# Patient Record
Sex: FEMALE | Race: BLACK OR AFRICAN AMERICAN | NOT HISPANIC OR LATINO | Employment: FULL TIME | URBAN - METROPOLITAN AREA
[De-identification: names, ages, dates, MRNs, and addresses within clinical notes are randomized per-mention and may not be internally consistent; named-entity substitution may affect disease eponyms.]

---

## 2024-04-23 DIAGNOSIS — O09.292 HISTORY OF PRE-ECLAMPSIA IN PRIOR PREGNANCY, CURRENTLY PREGNANT IN SECOND TRIMESTER: ICD-10-CM

## 2024-04-23 DIAGNOSIS — G40.909 SEIZURE DISORDER DURING PREGNANCY IN SECOND TRIMESTER: Primary | ICD-10-CM

## 2024-04-23 DIAGNOSIS — O99.352 SEIZURE DISORDER DURING PREGNANCY IN SECOND TRIMESTER: Primary | ICD-10-CM

## 2024-06-12 DIAGNOSIS — G40.909 SEIZURE DISORDER DURING PREGNANCY IN SECOND TRIMESTER: Primary | ICD-10-CM

## 2024-06-12 DIAGNOSIS — O09.292 HISTORY OF PRE-ECLAMPSIA IN PRIOR PREGNANCY, CURRENTLY PREGNANT IN SECOND TRIMESTER: ICD-10-CM

## 2024-06-12 DIAGNOSIS — O99.352 SEIZURE DISORDER DURING PREGNANCY IN SECOND TRIMESTER: Primary | ICD-10-CM

## 2024-06-17 ENCOUNTER — PROCEDURE VISIT (OUTPATIENT)
Dept: MATERNAL FETAL MEDICINE | Facility: CLINIC | Age: 29
End: 2024-06-17
Payer: MEDICAID

## 2024-06-17 ENCOUNTER — OFFICE VISIT (OUTPATIENT)
Dept: MATERNAL FETAL MEDICINE | Facility: CLINIC | Age: 29
End: 2024-06-17
Payer: MEDICAID

## 2024-06-17 VITALS
HEIGHT: 55 IN | DIASTOLIC BLOOD PRESSURE: 74 MMHG | HEART RATE: 101 BPM | SYSTOLIC BLOOD PRESSURE: 100 MMHG | BODY MASS INDEX: 30.55 KG/M2 | WEIGHT: 132 LBS

## 2024-06-17 DIAGNOSIS — O09.292 HISTORY OF PRE-ECLAMPSIA IN PRIOR PREGNANCY, CURRENTLY PREGNANT IN SECOND TRIMESTER: ICD-10-CM

## 2024-06-17 DIAGNOSIS — O99.352 SEIZURE DISORDER DURING PREGNANCY IN SECOND TRIMESTER: ICD-10-CM

## 2024-06-17 DIAGNOSIS — O09.90 AT HIGH RISK FOR COMPLICATIONS OF INTRAUTERINE PREGNANCY (IUP): ICD-10-CM

## 2024-06-17 DIAGNOSIS — G40.909 SEIZURE DISORDER DURING PREGNANCY IN SECOND TRIMESTER: ICD-10-CM

## 2024-06-17 DIAGNOSIS — O09.299 HX OF PRE-ECLAMPSIA IN PRIOR PREGNANCY, CURRENTLY PREGNANT: ICD-10-CM

## 2024-06-17 DIAGNOSIS — G40.909 NONINTRACTABLE EPILEPSY WITHOUT STATUS EPILEPTICUS, UNSPECIFIED EPILEPSY TYPE: Primary | Chronic | ICD-10-CM

## 2024-06-17 PROCEDURE — 1159F MED LIST DOCD IN RCRD: CPT | Mod: CPTII,S$GLB,, | Performed by: OBSTETRICS & GYNECOLOGY

## 2024-06-17 PROCEDURE — 1160F RVW MEDS BY RX/DR IN RCRD: CPT | Mod: CPTII,S$GLB,, | Performed by: OBSTETRICS & GYNECOLOGY

## 2024-06-17 PROCEDURE — 3074F SYST BP LT 130 MM HG: CPT | Mod: CPTII,S$GLB,, | Performed by: OBSTETRICS & GYNECOLOGY

## 2024-06-17 PROCEDURE — 3008F BODY MASS INDEX DOCD: CPT | Mod: CPTII,S$GLB,, | Performed by: OBSTETRICS & GYNECOLOGY

## 2024-06-17 PROCEDURE — 3078F DIAST BP <80 MM HG: CPT | Mod: CPTII,S$GLB,, | Performed by: OBSTETRICS & GYNECOLOGY

## 2024-06-17 PROCEDURE — 76815 OB US LIMITED FETUS(S): CPT | Mod: S$GLB,,, | Performed by: OBSTETRICS & GYNECOLOGY

## 2024-06-17 PROCEDURE — 99203 OFFICE O/P NEW LOW 30 MIN: CPT | Mod: TH,S$GLB,, | Performed by: OBSTETRICS & GYNECOLOGY

## 2024-06-17 RX ORDER — LEVETIRACETAM 500 MG/1
TABLET, EXTENDED RELEASE ORAL
COMMUNITY

## 2024-06-17 RX ORDER — PROMETHAZINE HYDROCHLORIDE 12.5 MG/1
12.5 TABLET ORAL 2 TIMES DAILY PRN
COMMUNITY
Start: 2024-04-05

## 2024-06-17 RX ORDER — ASPIRIN 81 MG/1
81 TABLET ORAL 2 TIMES DAILY
Qty: 60 TABLET | Refills: 11 | Status: SHIPPED | OUTPATIENT
Start: 2024-06-17 | End: 2025-06-17

## 2024-06-17 NOTE — PROGRESS NOTES
Maternal Fetal Medicine New Consult    Subjective:     Patient ID: [id number]    Chief Complaint: MFM Consult w/us  (For seizure disorder and postpartum pre-eclampsia)      HPI: 28 y.o.  female  at 16w0d gestation with Estimated Date of Delivery: 24. by early US, not consistent with LMP. She is sent for MFM consultation for epilepsy, history of postpartum preeclampsia.     Patient has history of epilepsy. She reports no seizure in years. She is currently on Keppra 500 mg twice daily and folic acid 1 mg daily. She follows with neurologist in [place] at [facility].  She has history of postpartum preeclampsia in previous pregnancy. She was on antihypertensives for about a month postpartum then was above to d/c with normal BP's.  Patient was accompanied by her partner and father of the baby Evelio Mendez       She denies any personal or family history of aneuploidy or anomalies. She denies any exposure to high fevers, viral rashes, illicit drugs or alcohol in this pregnancy.  She denies any leaking fluid, vaginal bleeding, contractions, decreased fetal movement. Denies headaches, visual disturbances, or epigastric pain.       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 No pertinent surgical history.    Family History   Problem Relation Name Age of Onset    Eclampsia Sister         Social History     Socioeconomic History    Marital status: Single   Tobacco Use    Smoking status: Never     Passive exposure: Never    Smokeless tobacco: Never   Substance and Sexual Activity    Alcohol use: Never    Drug use: Never    Sexual  "activity: Not Currently     Partners: Male       Current Outpatient Medications   Medication Sig Dispense Refill    folic acid (FOLVITE) 1 MG tablet Take 1 tablet (1 mg total) by mouth once daily. 90 tablet 3    levetiracetam XR (KEPPRA XR) 500 mg Tb24 24 hr tablet 1 tablet Orally twice a day      promethazine (PHENERGAN) 12.5 MG Tab Take 12.5 mg by mouth 2 (two) times daily as needed.      aspirin (ECOTRIN) 81 MG EC tablet Take 1 tablet (81 mg total) by mouth 2 (two) times a day. 60 tablet 11     No current facility-administered medications for this visit.       Review of patient's allergies indicates:  No Known Allergies     Review of Systems   12 point review of systems conducted, negative except as stated in the history of present illness. See HPI for details.      Objective:     Visit Vitals  /74 (BP Location: Left arm, Patient Position: Sitting, BP Method: Medium (Automatic))   Pulse 101   Ht 4' 6" (1.372 m)   Wt 59.9 kg (132 lb)   LMP  (LMP Unknown)   BMI 31.83 kg/m²        Physical Exam  Vitals and nursing note reviewed.   Constitutional:       General: She is not in acute distress.     Appearance: Normal appearance.      Comments: Increased BMI   HENT:      Head: Normocephalic and atraumatic.      Nose: Nose normal. No congestion.      Mouth/Throat:      Pharynx: Oropharynx is clear.   Eyes:      General: No scleral icterus.     Conjunctiva/sclera: Conjunctivae normal.   Cardiovascular:      Rate and Rhythm: Normal rate and regular rhythm.   Pulmonary:      Effort: No respiratory distress.      Breath sounds: Normal breath sounds. No wheezing.   Abdominal:      General: Abdomen is flat.      Palpations: Abdomen is soft.      Tenderness: There is no abdominal tenderness. There is no right CVA tenderness, left CVA tenderness or guarding.      Comments: No CVA tenderness gravid uterus.    Musculoskeletal:         General: Normal range of motion.      Cervical back: Neck supple.      Right lower leg: No " edema.      Left lower leg: No edema.   Skin:     General: Skin is warm.      Findings: No bruising or rash.   Neurological:      General: No focal deficit present.      Mental Status: She is oriented to person, place, and time.      Deep Tendon Reflexes: Reflexes normal.      Comments: Normal reflexes   Psychiatric:         Mood and Affect: Mood normal.         Behavior: Behavior normal.         Thought Content: Thought content normal.         Judgment: Judgment normal.         Assessment/Plan:     28 y.o.  female with IUP at 16w0d    Seizure disorder in pregnancy  Fetal size consistent with dates today, and no anomalies seen on early ultrasound.     I discussed with her the association of seizure disorder with higher risk of anomalies even without any use of medication. There is an increased risk of both major and minor malformations in fetuses exposed to anti-epileptic drugs (4-6% vs population risk of 2-3%). The most common major congenital malformations associated with these medications are neural tube, congenital heart and urinary tract defects, skeletal abnormalities, and cleft palate. The risk of fetal malformations is strongly influenced by the specific medication used.    Keppra is among the newer anti-epileptic drugs, and the risks are not yet certain, but small studies report minimal increased risks.     The risk is higher with the potential use of seizure medications.  Polytherapy with more than one antiepileptic drug increases the risk for malformations to 6-9%. In addition, the gestational timing of the exposure is critical as most organogenesis is complete by 10 weeks gestation. At this stage (past first trimester), there is no additional risk of anomalies. I discussed that the risk of seizure outweigh any risk of medication and recommend her to continue current medication regimen per neurology (keppra 500 mg BID), and advised to continue the Folic acid 1 mg daily. If she has any seizures, she  needs to report that immediately.  It is also recommended to avoid driving or operating heavy/hazardous equipment until 6 months post last seizure.    There are risks to the fetus from maternal seizures. Fetal hypoxia may occur as a result of decreased placental blood flow or postictal apnea and there are risks of injury to the fetus, abruption, or miscarriage due to maternal trauma sustained during a seizure.    Additionally, the higher risk of fetal growth restriction with seizure disorder was addressed.  I recommend fetal anatomy scan in 4 weeks.  I recommend doing fetal kick counts throughout the second and third trimester. If evidence of fetal growth restriction or other complications occur, then more formal fetal surveillance will be needed.    Elevated BMI greater than 30  Body mass index is 31.83 kg/m².    I discussed the risk of miscarriage in first trimester, recurrent miscarriages, congenital anomalies, hypertension, diabetes,  labor and the higher risk of  section and the higher risk of fetal demise in-utero. There is also higher risk of for excessive fetal weight and large for gestational age (LGA) fetuses. Mothers with LGA fetuses are at higher risk of prolonged labor,  delivery, shoulder dystocia and birth trauma. LGA neonates are increased risk of fetal hypoxia and intrauterine death, and are at risk to develop diabetes, obesity, metabolic syndrome, asthma and cancer later in life. She was advised of the importance of eating healthy and limiting weight gain to 11-20 lbs during the pregnancy, as optimal in this situation. I recommended low calorie, low fat diet avoiding any additional excessive weight gain. Excess weight gain would be associated with gestational hypertension, gestational diabetes and adverse  outcomes, including fetal demise in utero.    With elevated BMI, start fetal kick counts at 24 weeks. .       Importance of working on losing weight after the  pregnancy is over, especially before a future pregnancy was discussed. Breastfeeding may be an important tool in reducing the postpartum weight retention. Fetal risks were discussed with short term risk of fetal/ obesity and long term risk of adolescent component of metabolic syndrome.        History of postpartum preeclampsia in previous pregnancy  I discussed with her higher risk of recurrence even at an earlier gestational age with morbidity and mortality associated with that. I advised her of the option of the potential benefit of baby aspirin to decrease risk of recurrence that outweighs potential risk associated with its use. Low-dose aspirin as preventive medication does not increase the risk for placental abruption, postpartum hemorrhage, or fetal intracranial bleeding. Low-dose aspirin (range, 60 to 150 mg/d) reduced the risk for preeclampsia by 24% in clinical trials and reduced the risk for  birth by 14% and FGR by 20%.      With her risk factors, including  preeclampsia/GHTN in a previous pregnancy BMI over 30,  ancestry, and low socioeconomic status, it was agreed to adjust to asa 81 mg BID, due to multiple risk factors for preeclampsia. After discussing benefits (more effective than daily) vs potential risks (less data on safety with use at delivery), and continue until 34 6/7weeks, then decrease to once daily until delivery.. Also recommend baby aspirin use in all future pregnancies starting at 12 weeks and work on having healthy weight prior to any future pregnancy. Questions answered. Patient verbalized understanding.    Patient was counseled on the management plan for seizures ordered with the pregnancy.  Her seizures appeared to have been very well controlled over the last few years with no seizures for years.  Patient will continue same dose of Keppra 500 mg p.o. b.i.d. as well as folic acid 1 mg daily.  After counseling on aspirin daily versus b.i.d. agreed to do b.i.d.  till around 34 weeks' gestation.  We will plan to do a level 2 scan in 4 weeks. Patient's questions were answered.  She is in agreement with plan of care.         Follow up in about 4 weeks (around 7/15/2024) for MFM Followup, Level 2 Ultrasound.     No future appointments.       Patient was evaluated by Tasia Hodgson, DONNY, and and Dr. Charles.  Final assessment and recommendations as stated above were made by Dr. Charles.    This note was created with the assistance of [employer] voice recognition software. There may be transcription errors as a result of using this technology, however minimal. Effort has been made to ensure accuracy of transcription, but any obvious errors or omissions should be clarified with the author of the document.

## 2024-07-09 DIAGNOSIS — O99.352 SEIZURE DISORDER DURING PREGNANCY IN SECOND TRIMESTER: Primary | ICD-10-CM

## 2024-07-09 DIAGNOSIS — O09.292 HISTORY OF PRE-ECLAMPSIA IN PRIOR PREGNANCY, CURRENTLY PREGNANT IN SECOND TRIMESTER: ICD-10-CM

## 2024-07-09 DIAGNOSIS — G40.909 SEIZURE DISORDER DURING PREGNANCY IN SECOND TRIMESTER: Primary | ICD-10-CM

## 2024-07-16 NOTE — PROGRESS NOTES
"  Maternal Fetal Medicine Follow Up    Subjective:     Patient ID: [id number]    Chief Complaint: mfm followup w/us      HPI: Nicole Dougherty is a 28 y.o. female  at 20w2d gestation with Estimated Date of Delivery: 24  who is here for follow-up consultation by MFM.    Patient has history of epilepsy. She reports no seizure in years. She is currently on Keppra 500 mg twice daily and folic acid 1 mg daily. She follows with neurologist in [place] at [facility].  She had elevated BMI of 31.83 on initial MFM consult visit.  She has history of postpartum preeclampsia in previous pregnancy. She is on low-dose aspirin twice daily.       Interval history since last MFM visit: None.. She denies any leaking fluid, vaginal bleeding, contractions, decreased fetal movement. Denies headaches, visual disturbances, or epigastric pain.    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    Medications:  Current Outpatient Medications   Medication Instructions    aspirin (ECOTRIN) 81 mg, Oral, 2 times daily    folic acid (FOLVITE) 1 mg, Oral, Daily    levetiracetam XR (KEPPRA XR) 500 mg Tb24 24 hr tablet 1 tablet Orally twice a day    PNV no.153/FA/om3/dha/epa/fish (PRENATAL GUMMIES ORAL) Oral    promethazine (PHENERGAN) 12.5 mg, Oral, 2 times daily PRN       Review of Systems   12 point review of systems conducted, negative except as stated in the history of present illness. See HPI for details.      Objective:     Visit Vitals  BP 92/68 (BP Location: Left arm, Patient Position: Sitting, BP Method: Large (Automatic))   Pulse 92   Ht 4' 6" " (1.372 m)   Wt 60.1 kg (132 lb 6.4 oz)   LMP  (LMP Unknown)   BMI 31.92 kg/m²        Physical Exam  Vitals and nursing note reviewed.   Constitutional:       Appearance: Normal appearance.      Comments: Increased BMI   HENT:      Head: Normocephalic and atraumatic.      Nose: Nose normal. No congestion.   Cardiovascular:      Rate and Rhythm: Normal rate.   Pulmonary:      Effort: Pulmonary effort is normal.   Skin:     Findings: No rash.   Neurological:      Mental Status: She is alert and oriented to person, place, and time.   Psychiatric:         Mood and Affect: Mood normal.         Behavior: Behavior normal.         Thought Content: Thought content normal.         Judgment: Judgment normal.         Assessment/Plan:     28 y.o.  female with IUP at 20w2d    Seizure disorder in pregnancy  There is normal fetal growth with no anomalies seen today.  Fetal anatomy scan was incomplete.  on 24.  AFV is normal.      The risk of seizures outweigh any risk of medication and recommend her to continue current medication regimen per neurology (keppra 500 mg BID), and advised to continue the Folic acid 1 mg daily. If she has any seizures, she needs to report that immediately.  It is also recommended to avoid driving or operating heavy/hazardous equipment until 6 months post last seizure.    We will plan to see the patient again in about 5 weeks to complete anatomy scan.  With the higher risk of fetal growth restriction with seizure disorder, recommend follow-up ultrasound to assess fetal growth around 32 weeks. If evidence of fetal growth restriction or other complications occur, then more formal fetal surveillance will be needed.      Elevated BMI greater than 30  Body mass index is 31.92 kg/m². With stable weight since last visit, she was advised to continue healthy and low caloric diet.  Excess weight gain would be associated with gestational hypertension, gestational diabetes and adverse  outcomes,  including fetal demise in utero.    Reviewed importance of FKC 3/day and prn starting around 24 weeks with instructions to immediately report any decreased fetal movement.    It is important to lose weight after the pregnancy is over, especially before a future pregnancy. Breastfeeding may be an important tool in reducing the postpartum weight retention. Fetal risks were discussed with short term risk of fetal/ obesity and long term risk of adolescent component of metabolic syndrome.      History of postpartum preeclampsia in previous pregnancy  With her risk factors for preeclampsia, she will continue low dose aspirin twice daily until 34 6/7weeks, then decrease to once daily until delivery after that. Preeclampsia precautions reviewed.         Continue Keppra 500 b.i.d. along with folic acid 1 mg daily.  Continue aspirin b.i.d..  Diet advice.  We will plan to see in 5 weeks to complete anatomy scan.      Follow up in about 5 weeks (around 2024) for MFM follow-up, Repeat ultrasound.     No future appointments.       DANILO involvement: Patient was evaluated and examined by Dr. Charles. DONNY Chiu, helped in pre charting of part of note.    This note was created with the assistance of [employer] voice recognition software. There may be transcription errors as a result of using this technology, however minimal. Effort has been made to ensure accuracy of transcription, but any obvious errors or omissions should be clarified with the author of the document.

## 2024-07-17 ENCOUNTER — OFFICE VISIT (OUTPATIENT)
Dept: MATERNAL FETAL MEDICINE | Facility: CLINIC | Age: 29
End: 2024-07-17
Payer: MEDICAID

## 2024-07-17 ENCOUNTER — PROCEDURE VISIT (OUTPATIENT)
Dept: MATERNAL FETAL MEDICINE | Facility: CLINIC | Age: 29
End: 2024-07-17
Payer: MEDICAID

## 2024-07-17 VITALS
BODY MASS INDEX: 30.64 KG/M2 | WEIGHT: 132.38 LBS | HEIGHT: 55 IN | DIASTOLIC BLOOD PRESSURE: 68 MMHG | SYSTOLIC BLOOD PRESSURE: 92 MMHG | HEART RATE: 92 BPM

## 2024-07-17 DIAGNOSIS — G40.909 SEIZURE DISORDER DURING PREGNANCY IN SECOND TRIMESTER: ICD-10-CM

## 2024-07-17 DIAGNOSIS — O09.292 HISTORY OF PRE-ECLAMPSIA IN PRIOR PREGNANCY, CURRENTLY PREGNANT IN SECOND TRIMESTER: ICD-10-CM

## 2024-07-17 DIAGNOSIS — O09.90 AT HIGH RISK FOR COMPLICATIONS OF INTRAUTERINE PREGNANCY (IUP): ICD-10-CM

## 2024-07-17 DIAGNOSIS — G40.909 SEIZURE DISORDER DURING PREGNANCY IN SECOND TRIMESTER: Primary | ICD-10-CM

## 2024-07-17 DIAGNOSIS — O99.352 SEIZURE DISORDER DURING PREGNANCY IN SECOND TRIMESTER: Primary | ICD-10-CM

## 2024-07-17 DIAGNOSIS — O99.352 SEIZURE DISORDER DURING PREGNANCY IN SECOND TRIMESTER: ICD-10-CM

## 2024-07-17 DIAGNOSIS — O09.299 HX OF PRE-ECLAMPSIA IN PRIOR PREGNANCY, CURRENTLY PREGNANT: ICD-10-CM

## 2024-07-17 PROCEDURE — 76811 OB US DETAILED SNGL FETUS: CPT | Mod: S$GLB,,, | Performed by: OBSTETRICS & GYNECOLOGY

## 2024-07-17 PROCEDURE — 3078F DIAST BP <80 MM HG: CPT | Mod: CPTII,S$GLB,, | Performed by: OBSTETRICS & GYNECOLOGY

## 2024-07-17 PROCEDURE — 1159F MED LIST DOCD IN RCRD: CPT | Mod: CPTII,S$GLB,, | Performed by: OBSTETRICS & GYNECOLOGY

## 2024-07-17 PROCEDURE — 1160F RVW MEDS BY RX/DR IN RCRD: CPT | Mod: CPTII,S$GLB,, | Performed by: OBSTETRICS & GYNECOLOGY

## 2024-07-17 PROCEDURE — 3008F BODY MASS INDEX DOCD: CPT | Mod: CPTII,S$GLB,, | Performed by: OBSTETRICS & GYNECOLOGY

## 2024-07-17 PROCEDURE — 3074F SYST BP LT 130 MM HG: CPT | Mod: CPTII,S$GLB,, | Performed by: OBSTETRICS & GYNECOLOGY

## 2024-07-17 PROCEDURE — 99213 OFFICE O/P EST LOW 20 MIN: CPT | Mod: TH,S$GLB,, | Performed by: OBSTETRICS & GYNECOLOGY

## 2024-08-19 DIAGNOSIS — O99.352 SEIZURE DISORDER DURING PREGNANCY IN SECOND TRIMESTER: Primary | ICD-10-CM

## 2024-08-19 DIAGNOSIS — G40.909 SEIZURE DISORDER DURING PREGNANCY IN SECOND TRIMESTER: Primary | ICD-10-CM

## 2024-08-20 NOTE — PROGRESS NOTES
Maternal Fetal Medicine Follow Up    Subjective:     Patient ID: [id number]    Chief Complaint: M follow up with US      HPI: Nicole Dougherty is a 29 y.o. female  at 25w2d gestation with Estimated Date of Delivery: 24  who is here for follow-up consultation by MFM.    Patient has history of epilepsy. She reports no seizure in years. She is currently on Keppra 500 mg twice daily and folic acid 1 mg daily. She follows with neurologist in [place] at [facility].  She had elevated BMI of 31.83 on initial MFM consult visit.  She has history of postpartum preeclampsia in previous pregnancy. She is on low-dose aspirin twice daily.       Interval history since last MFM visit: None.. She denies any leaking fluid, vaginal bleeding, contractions, decreased fetal movement. Denies headaches, visual disturbances, or epigastric pain.    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    Medications:  Current Outpatient Medications   Medication Instructions    aspirin (ECOTRIN) 81 mg, Oral, 2 times daily    folic acid (FOLVITE) 1 mg, Oral, Daily    levetiracetam XR (KEPPRA XR) 500 mg Tb24 24 hr tablet 1 tablet Orally twice a day    PNV no.153/FA/om3/dha/epa/fish (PRENATAL GUMMIES ORAL) Oral    promethazine (PHENERGAN) 12.5 mg, Oral, 2 times daily PRN       Review of Systems   12 point review of systems conducted, negative except as stated in the history of present illness. See HPI for details.      Objective:     Visit Vitals  BP 95/65 (BP Location: Right arm, Patient Position: Sitting, BP Method: Medium (Automatic))   Pulse 90   Ht  "4' 6" (1.372 m)   Wt 61.2 kg (135 lb)   LMP  (LMP Unknown)   BMI 32.55 kg/m²        Physical Exam  Vitals and nursing note reviewed.   Constitutional:       Appearance: Normal appearance.      Comments: Increased BMI   HENT:      Head: Normocephalic and atraumatic.      Nose: Nose normal. No congestion.   Cardiovascular:      Rate and Rhythm: Normal rate.   Pulmonary:      Effort: Pulmonary effort is normal.   Skin:     Findings: No rash.   Neurological:      Mental Status: She is alert and oriented to person, place, and time.   Psychiatric:         Mood and Affect: Mood normal.         Behavior: Behavior normal.         Thought Content: Thought content normal.         Judgment: Judgment normal.         Assessment/Plan:     29 y.o.  female with IUP at 25w2d    Seizure disorder in pregnancy  There is normal fetal growth with an EFW of 772 g at the 41% and the AC at the 25% on 24.  AFV is normal.      The risk of seizures outweigh any risk of medication and recommend her to continue current medication regimen per neurology (keppra 500 mg BID), and advised to continue the Folic acid 1 mg daily. If she has any seizures, she needs to report that immediately.  It is also recommended to avoid driving or operating heavy/hazardous equipment until 6 months post last seizure.    With the higher risk of fetal growth restriction with seizure disorder, recommend follow-up ultrasound to assess fetal growth around 32 weeks. If evidence of fetal growth restriction or other complications occur, then more formal fetal surveillance will be needed.      Elevated BMI greater than 30  Body mass index is 32.55 kg/m². With stable weight since last visit, she was advised to continue healthy and low caloric diet.  Excess weight gain would be associated with gestational hypertension, gestational diabetes and adverse  outcomes, including fetal demise in utero.    Reviewed importance of FKC 3/day and prn starting around 24 " weeks with instructions to immediately report any decreased fetal movement.    It is important to lose weight after the pregnancy is over, especially before a future pregnancy. Breastfeeding may be an important tool in reducing the postpartum weight retention. Fetal risks were discussed with short term risk of fetal/ obesity and long term risk of adolescent component of metabolic syndrome.      History of postpartum preeclampsia in previous pregnancy  With her risk factors for preeclampsia, she will continue low dose aspirin twice daily until 34 6/7weeks, then decrease to once daily until delivery after that. Preeclampsia precautions reviewed.    There is continued normal fetal growth.  Fetal anatomy scan reassuring.  Continue aspirin b.i.d..  Fetal kick count instructions and preeclampsia warnings.  We will plan to rechecked in 6 weeks.    Follow up in about 6 weeks (around 10/2/2024) for MFM follow-up, Repeat ultrasound.     No future appointments.      DANILO involvement: Patient was evaluated and examined by Dr. Charles. CHAD Fishman, helped in pre charting of part of note.    This note was created with the assistance of [employer] voice recognition software. There may be transcription errors as a result of using this technology, however minimal. Effort has been made to ensure accuracy of transcription, but any obvious errors or omissions should be clarified with the author of the document.

## 2024-08-21 ENCOUNTER — OFFICE VISIT (OUTPATIENT)
Dept: MATERNAL FETAL MEDICINE | Facility: CLINIC | Age: 29
End: 2024-08-21
Payer: MEDICAID

## 2024-08-21 ENCOUNTER — PROCEDURE VISIT (OUTPATIENT)
Dept: MATERNAL FETAL MEDICINE | Facility: CLINIC | Age: 29
End: 2024-08-21
Payer: MEDICAID

## 2024-08-21 VITALS
HEIGHT: 55 IN | DIASTOLIC BLOOD PRESSURE: 65 MMHG | WEIGHT: 135 LBS | SYSTOLIC BLOOD PRESSURE: 95 MMHG | HEART RATE: 90 BPM | BODY MASS INDEX: 31.24 KG/M2

## 2024-08-21 DIAGNOSIS — O09.299 HX OF PRE-ECLAMPSIA IN PRIOR PREGNANCY, CURRENTLY PREGNANT: ICD-10-CM

## 2024-08-21 DIAGNOSIS — O99.352 SEIZURE DISORDER DURING PREGNANCY IN SECOND TRIMESTER: ICD-10-CM

## 2024-08-21 DIAGNOSIS — G40.909 SEIZURE DISORDER DURING PREGNANCY IN SECOND TRIMESTER: Primary | ICD-10-CM

## 2024-08-21 DIAGNOSIS — G40.909 SEIZURE DISORDER DURING PREGNANCY IN SECOND TRIMESTER: ICD-10-CM

## 2024-08-21 DIAGNOSIS — O99.352 SEIZURE DISORDER DURING PREGNANCY IN SECOND TRIMESTER: Primary | ICD-10-CM

## 2024-08-21 PROCEDURE — 3008F BODY MASS INDEX DOCD: CPT | Mod: CPTII,S$GLB,, | Performed by: OBSTETRICS & GYNECOLOGY

## 2024-08-21 PROCEDURE — 1159F MED LIST DOCD IN RCRD: CPT | Mod: CPTII,S$GLB,, | Performed by: OBSTETRICS & GYNECOLOGY

## 2024-08-21 PROCEDURE — 3074F SYST BP LT 130 MM HG: CPT | Mod: CPTII,S$GLB,, | Performed by: OBSTETRICS & GYNECOLOGY

## 2024-08-21 PROCEDURE — 3078F DIAST BP <80 MM HG: CPT | Mod: CPTII,S$GLB,, | Performed by: OBSTETRICS & GYNECOLOGY

## 2024-08-21 PROCEDURE — 1160F RVW MEDS BY RX/DR IN RCRD: CPT | Mod: CPTII,S$GLB,, | Performed by: OBSTETRICS & GYNECOLOGY

## 2024-08-21 PROCEDURE — 99213 OFFICE O/P EST LOW 20 MIN: CPT | Mod: TH,S$GLB,, | Performed by: OBSTETRICS & GYNECOLOGY

## 2024-08-21 PROCEDURE — 76816 OB US FOLLOW-UP PER FETUS: CPT | Mod: S$GLB,,, | Performed by: OBSTETRICS & GYNECOLOGY

## 2024-09-27 DIAGNOSIS — G40.909 SEIZURE DISORDER DURING PREGNANCY IN SECOND TRIMESTER: Primary | ICD-10-CM

## 2024-09-27 DIAGNOSIS — O99.352 SEIZURE DISORDER DURING PREGNANCY IN SECOND TRIMESTER: Primary | ICD-10-CM

## 2024-10-01 PROBLEM — O99.353 SEIZURE DISORDER DURING PREGNANCY IN THIRD TRIMESTER: Status: ACTIVE | Noted: 2024-06-17

## 2024-10-01 NOTE — PROGRESS NOTES
Maternal Fetal Medicine Follow Up    Subjective:     Patient ID: [id number]    Chief Complaint: mfm followup w/us      HPI: Nicole Dougherty is a 29 y.o. female  at 31w2d gestation with Estimated Date of Delivery: 24  who is here for follow-up consultation by MFM.    Patient has history of epilepsy. She reports no seizure in years. She is currently on Keppra 500 mg twice daily and folic acid 1 mg daily. She follows with neurologist in [place] at [facility].  She had elevated BMI of 31.83 on initial MFM consult visit.  She has history of postpartum preeclampsia in previous pregnancy. She is on low-dose aspirin twice daily.       Interval history since last MFM visit: None.. She denies any leaking fluid, vaginal bleeding, contractions, decreased fetal movement. Denies headaches, visual disturbances, or epigastric pain.    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    Medications:  Current Outpatient Medications   Medication Instructions    aspirin (ECOTRIN) 81 mg, Oral, 2 times daily    folic acid (FOLVITE) 1 mg, Oral, Daily    ICAR-C PLUS Tab 1 tablet Orally once a day for 30 days    levetiracetam XR (KEPPRA XR) 500 mg Tb24 24 hr tablet 1 tablet Orally twice a day    PNV no.153/FA/om3/dha/epa/fish (PRENATAL GUMMIES ORAL) Take by mouth.    promethazine (PHENERGAN) 12.5 mg, 2 times daily PRN       Review of Systems   12 point review of systems conducted, negative except as stated in the history of present illness. See HPI for details.      Objective:     Visit Vitals  BP (!) 100/59 (BP Location: Left arm, Patient  "Position: Sitting)   Pulse 90   Ht 4' 6" (1.372 m)   Wt 62.5 kg (137 lb 12.8 oz)   LMP  (LMP Unknown)   BMI 33.22 kg/m²        Physical Exam  Vitals and nursing note reviewed.   Constitutional:       Appearance: Normal appearance.      Comments: Increased BMI   HENT:      Head: Normocephalic and atraumatic.      Nose: Nose normal. No congestion.   Cardiovascular:      Rate and Rhythm: Normal rate.   Pulmonary:      Effort: Pulmonary effort is normal.   Skin:     Findings: No rash.   Neurological:      Mental Status: She is alert and oriented to person, place, and time.   Psychiatric:         Mood and Affect: Mood normal.         Behavior: Behavior normal.         Thought Content: Thought content normal.         Judgment: Judgment normal.         Assessment/Plan:     29 y.o.  female with IUP at 31w2d    Seizure disorder in pregnancy  There is normal fetal growth with an EFW of 1749 g at the 31% and the AC at the 42% on 10/2/24.  AFV is normal. BPP is 8/8 today (10/02/2024).     The risk of seizures outweigh any risk of medication and recommend her to continue current medication regimen per neurology (keppra 500 mg BID), and advised to continue the Folic acid 1 mg daily. If she has any seizures, she needs to report that immediately.  It is also recommended to avoid driving or operating heavy/hazardous equipment until 6 months post last seizure.    With the higher risk of fetal growth restriction with seizure disorder, recommend follow-up ultrasound to assess fetal growth in 4 weeks. If evidence of fetal growth restriction or other complications occur, then more formal fetal surveillance will be needed.      Elevated BMI greater than 30  Body mass index is 33.22 kg/m². With stable weight since last visit, she was advised to continue healthy and low caloric diet.  Excess weight gain would be associated with gestational hypertension, gestational diabetes and adverse  outcomes, including fetal demise in " utero.    Reviewed fetal kick count instructions..    It is important to lose weight after the pregnancy is over, especially before a future pregnancy. Breastfeeding may be an important tool in reducing the postpartum weight retention. Fetal risks were discussed with short term risk of fetal/ obesity and long term risk of adolescent component of metabolic syndrome.      History of postpartum preeclampsia in previous pregnancy  With her risk factors for preeclampsia, she will continue low dose aspirin twice daily until 34 6/7weeks, then decrease to once daily until delivery after that. Preeclampsia precautions reviewed.    There is continued normal fetal growth.  We will plan to rechecked again in about 4 weeks.  Patient will continue aspirin b.i.d..  Continue Keppra 500 b.i.d. with folic acid 1 mg daily.  There has been no recent seizures.    Follow up in about 4 weeks (around 10/30/2024) for MFM follow-up, Repeat ultrasound, BPP.     No future appointments.    DANILO involvement: Patient was evaluated and examined by Dr. Charles. CHAD Fishman, helped in pre charting of part of note.    This note was created with the assistance of [employer] voice recognition software. There may be transcription errors as a result of using this technology, however minimal. Effort has been made to ensure accuracy of transcription, but any obvious errors or omissions should be clarified with the author of the document.

## 2024-10-02 ENCOUNTER — PROCEDURE VISIT (OUTPATIENT)
Dept: MATERNAL FETAL MEDICINE | Facility: CLINIC | Age: 29
End: 2024-10-02
Payer: MEDICAID

## 2024-10-02 ENCOUNTER — OFFICE VISIT (OUTPATIENT)
Dept: MATERNAL FETAL MEDICINE | Facility: CLINIC | Age: 29
End: 2024-10-02
Payer: MEDICAID

## 2024-10-02 VITALS
SYSTOLIC BLOOD PRESSURE: 100 MMHG | BODY MASS INDEX: 31.89 KG/M2 | HEIGHT: 55 IN | HEART RATE: 90 BPM | DIASTOLIC BLOOD PRESSURE: 59 MMHG | WEIGHT: 137.81 LBS

## 2024-10-02 DIAGNOSIS — O99.352 SEIZURE DISORDER DURING PREGNANCY IN SECOND TRIMESTER: ICD-10-CM

## 2024-10-02 DIAGNOSIS — O09.299 HX OF PRE-ECLAMPSIA IN PRIOR PREGNANCY, CURRENTLY PREGNANT: ICD-10-CM

## 2024-10-02 DIAGNOSIS — G40.909 SEIZURE DISORDER DURING PREGNANCY IN THIRD TRIMESTER: Primary | ICD-10-CM

## 2024-10-02 DIAGNOSIS — O99.353 SEIZURE DISORDER DURING PREGNANCY IN THIRD TRIMESTER: Primary | ICD-10-CM

## 2024-10-02 DIAGNOSIS — G40.909 SEIZURE DISORDER DURING PREGNANCY IN SECOND TRIMESTER: ICD-10-CM

## 2024-10-02 PROCEDURE — 76816 OB US FOLLOW-UP PER FETUS: CPT | Mod: S$GLB,,, | Performed by: OBSTETRICS & GYNECOLOGY

## 2024-10-02 PROCEDURE — 3078F DIAST BP <80 MM HG: CPT | Mod: CPTII,S$GLB,, | Performed by: OBSTETRICS & GYNECOLOGY

## 2024-10-02 PROCEDURE — 1160F RVW MEDS BY RX/DR IN RCRD: CPT | Mod: CPTII,S$GLB,, | Performed by: OBSTETRICS & GYNECOLOGY

## 2024-10-02 PROCEDURE — 1159F MED LIST DOCD IN RCRD: CPT | Mod: CPTII,S$GLB,, | Performed by: OBSTETRICS & GYNECOLOGY

## 2024-10-02 PROCEDURE — 99213 OFFICE O/P EST LOW 20 MIN: CPT | Mod: TH,S$GLB,, | Performed by: OBSTETRICS & GYNECOLOGY

## 2024-10-02 PROCEDURE — 3008F BODY MASS INDEX DOCD: CPT | Mod: CPTII,S$GLB,, | Performed by: OBSTETRICS & GYNECOLOGY

## 2024-10-02 PROCEDURE — 3074F SYST BP LT 130 MM HG: CPT | Mod: CPTII,S$GLB,, | Performed by: OBSTETRICS & GYNECOLOGY

## 2024-10-02 RX ORDER — IRON,CARB/VIT C/VIT B12/FOLIC 100-250-1
TABLET ORAL
COMMUNITY
Start: 2024-09-19

## 2024-10-25 DIAGNOSIS — O99.353 SEIZURE DISORDER DURING PREGNANCY IN THIRD TRIMESTER: Primary | ICD-10-CM

## 2024-10-25 DIAGNOSIS — G40.909 SEIZURE DISORDER DURING PREGNANCY IN THIRD TRIMESTER: Primary | ICD-10-CM

## 2024-10-25 DIAGNOSIS — O09.299 HX OF PRE-ECLAMPSIA IN PRIOR PREGNANCY, CURRENTLY PREGNANT: ICD-10-CM

## 2024-10-30 ENCOUNTER — OFFICE VISIT (OUTPATIENT)
Dept: MATERNAL FETAL MEDICINE | Facility: CLINIC | Age: 29
End: 2024-10-30
Payer: MEDICAID

## 2024-10-30 ENCOUNTER — PROCEDURE VISIT (OUTPATIENT)
Dept: MATERNAL FETAL MEDICINE | Facility: CLINIC | Age: 29
End: 2024-10-30
Payer: MEDICAID

## 2024-10-30 VITALS
SYSTOLIC BLOOD PRESSURE: 105 MMHG | HEIGHT: 55 IN | BODY MASS INDEX: 32.63 KG/M2 | DIASTOLIC BLOOD PRESSURE: 73 MMHG | WEIGHT: 141 LBS | HEART RATE: 80 BPM

## 2024-10-30 DIAGNOSIS — O09.299 HX OF PRE-ECLAMPSIA IN PRIOR PREGNANCY, CURRENTLY PREGNANT: ICD-10-CM

## 2024-10-30 DIAGNOSIS — O99.353 SEIZURE DISORDER DURING PREGNANCY IN THIRD TRIMESTER: ICD-10-CM

## 2024-10-30 DIAGNOSIS — G40.909 SEIZURE DISORDER DURING PREGNANCY IN THIRD TRIMESTER: ICD-10-CM

## 2024-10-30 DIAGNOSIS — O09.90 AT HIGH RISK FOR COMPLICATIONS OF INTRAUTERINE PREGNANCY (IUP): ICD-10-CM

## 2024-10-30 DIAGNOSIS — G40.909 SEIZURE DISORDER DURING PREGNANCY IN THIRD TRIMESTER: Primary | ICD-10-CM

## 2024-10-30 DIAGNOSIS — O99.353 SEIZURE DISORDER DURING PREGNANCY IN THIRD TRIMESTER: Primary | ICD-10-CM

## 2024-10-30 DIAGNOSIS — O36.5930 POOR FETAL GROWTH AFFECTING MANAGEMENT OF MOTHER IN THIRD TRIMESTER, SINGLE OR UNSPECIFIED FETUS: ICD-10-CM

## 2024-10-30 PROCEDURE — 1160F RVW MEDS BY RX/DR IN RCRD: CPT | Mod: CPTII,S$GLB,, | Performed by: OBSTETRICS & GYNECOLOGY

## 2024-10-30 PROCEDURE — 99214 OFFICE O/P EST MOD 30 MIN: CPT | Mod: TH,S$GLB,, | Performed by: OBSTETRICS & GYNECOLOGY

## 2024-10-30 PROCEDURE — 76819 FETAL BIOPHYS PROFIL W/O NST: CPT | Mod: S$GLB,,, | Performed by: OBSTETRICS & GYNECOLOGY

## 2024-10-30 PROCEDURE — 76816 OB US FOLLOW-UP PER FETUS: CPT | Mod: S$GLB,,, | Performed by: OBSTETRICS & GYNECOLOGY

## 2024-10-30 PROCEDURE — 3008F BODY MASS INDEX DOCD: CPT | Mod: CPTII,S$GLB,, | Performed by: OBSTETRICS & GYNECOLOGY

## 2024-10-30 PROCEDURE — 3078F DIAST BP <80 MM HG: CPT | Mod: CPTII,S$GLB,, | Performed by: OBSTETRICS & GYNECOLOGY

## 2024-10-30 PROCEDURE — 76820 UMBILICAL ARTERY ECHO: CPT | Mod: S$GLB,,, | Performed by: OBSTETRICS & GYNECOLOGY

## 2024-10-30 PROCEDURE — 1159F MED LIST DOCD IN RCRD: CPT | Mod: CPTII,S$GLB,, | Performed by: OBSTETRICS & GYNECOLOGY

## 2024-10-30 PROCEDURE — 3074F SYST BP LT 130 MM HG: CPT | Mod: CPTII,S$GLB,, | Performed by: OBSTETRICS & GYNECOLOGY

## 2024-10-31 DIAGNOSIS — O36.5930 POOR FETAL GROWTH AFFECTING MANAGEMENT OF MOTHER IN THIRD TRIMESTER, SINGLE OR UNSPECIFIED FETUS: ICD-10-CM

## 2024-10-31 DIAGNOSIS — O09.299 HX OF PRE-ECLAMPSIA IN PRIOR PREGNANCY, CURRENTLY PREGNANT: ICD-10-CM

## 2024-10-31 DIAGNOSIS — G40.909 SEIZURE DISORDER DURING PREGNANCY IN THIRD TRIMESTER: Primary | ICD-10-CM

## 2024-10-31 DIAGNOSIS — O99.353 SEIZURE DISORDER DURING PREGNANCY IN THIRD TRIMESTER: Primary | ICD-10-CM

## 2024-11-01 NOTE — PROGRESS NOTES
Maternal Fetal Medicine Follow Up    Subjective:     Patient ID: [id number]    Chief Complaint: MFM follow up with [place]      HPI: Nicole Dougherty is a 29 y.o. female  at 36w0d gestation with Estimated Date of Delivery: 24  who is here for follow-up consultation by MFM.    Patient has history of epilepsy. She reports no seizure in years. She is currently on Keppra 500 mg twice daily and folic acid 1 mg daily. She follows with neurologist in [place] at [facility].  She had elevated BMI of 31.83 on initial MFM consult visit.  She has history of postpartum preeclampsia in previous pregnancy. She is on low-dose aspirin once daily.  She has mild fetal growth restriction and is here for fetal testing today.  She reported normal aneuploidy screening earlier in pregnancy (requested).  She declined amniocentesis.       Interval history since last MFM visit: None.. She denies any leaking fluid, vaginal bleeding, contractions, decreased fetal movement. Denies headaches, visual disturbances, or epigastric pain.    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    Medications:  Current Outpatient Medications   Medication Instructions    aspirin (ECOTRIN) 81 mg, Oral, 2 times daily    folic acid (FOLVITE) 1 mg, Oral, Daily    ICAR-C PLUS Tab 1 tablet Orally once a day for 30 days    levetiracetam XR (KEPPRA XR) 500 mg Tb24 24 hr tablet 1 tablet Orally twice a day    PNV no.153/FA/om3/dha/epa/fish (PRENATAL GUMMIES ORAL) Take by mouth.    promethazine (PHENERGAN)  "12.5 mg, 2 times daily PRN       Review of Systems   12 point review of systems conducted, negative except as stated in the history of present illness. See HPI for details.      Objective:     Visit Vitals  /64 (BP Location: Left arm, Patient Position: Sitting)   Pulse 89   Ht 4' 6" (1.372 m)   Wt 64.9 kg (143 lb)   LMP  (LMP Unknown)   BMI 34.48 kg/m²        Physical Exam  Vitals and nursing note reviewed.   Constitutional:       Appearance: Normal appearance.      Comments: Increased BMI   HENT:      Head: Normocephalic and atraumatic.      Nose: Nose normal. No congestion.   Cardiovascular:      Rate and Rhythm: Normal rate.   Pulmonary:      Effort: Pulmonary effort is normal.   Skin:     Findings: No rash.   Neurological:      Mental Status: She is alert and oriented to person, place, and time.   Psychiatric:         Mood and Affect: Mood normal.         Behavior: Behavior normal.         Thought Content: Thought content normal.         Judgment: Judgment normal.         Assessment/Plan:     29 y.o.  female with IUP at 36w0d    Seizure disorder in pregnancy  There was mild fetal growth restriction with an EFW of 2235 g at the 11% and the AC at the 2% on 10/30/2024.  AFV is normal. BPP is 8/8 today (2024).  Umbilical artery Doppler is normal today (2024).     The risk of seizures outweigh any risk of medication and recommend her to continue current medication regimen per neurology (keppra 500 mg BID), and advised to continue the Folic acid 1 mg daily. If she has any seizures, she needs to report that immediately.  It is also recommended to avoid driving or operating heavy/hazardous equipment until 6 months post last seizure.      Mild fetal growth restriction  She declined amniocentesis . She reported normal aneuploidy screening earlier in pregnancy (requested). . She was advised of need for  evaluation.    Complications of growth-restricted neonates include hypoglycemia, " hyperbilirubinemia, hypothermia, seizures, sepsis, IVH, RDS, necrotizing enterocolitis, and  asphyxia. Long-term complications include cognitive delay and adult diseases such as cardiovascular disorders and type 2 diabetes. There is an increased risk (~20%) for recurrence of fetal growth restriction in a future pregnancy.    Because of increased risk of stillbirth, will monitor interval fetal growth every 3 weeks and do twice weekly fetal testing, including an NST at least once per week. She was previously instructed that fetal testing is not a 100% protective against the risk of fetal demise in-utero. Reviewed the importance of doing fetal kick counts three times a day and resting in a lateral recumbent position and reporting immediately at any time there is any decreased fetal movement even if the same day of testing was emphasized.     Delivery is not indicated at this time, as risks of  mortality and morbidity with delivery, outweigh risks with continued pregnancy. Likewise, with stable condition, or too early a gestational age, steroids (and magnesium sulfate) are not recommended, as benefit is reaped if suspected imminent delivery in few days which, although possible, is very unlikely at this time. Plan delivery 38-39 weeks if continued fetal growth and reassuring fetal testing. Earlier delivery may be needed if worsening fetal growth, abnormal doppler, or abnormal fetal testing or other concerns.       Elevated BMI greater than 30  Body mass index is 34.48 kg/m². With  2 lb gain since last visit, she was advised to follow a healthy low caloric diet.  Excess weight gain would be associated with gestational hypertension, gestational diabetes and adverse  outcomes, including fetal demise in utero.    Reviewed importance of FKC 3/day and prn with instructions to immediately report any decreased fetal movement.    It is important to lose weight after the pregnancy is over, especially  before a future pregnancy. Breastfeeding may be an important tool in reducing the postpartum weight retention. Fetal risks were discussed with short term risk of fetal/ obesity and long term risk of adolescent component of metabolic syndrome.      History of postpartum preeclampsia in previous pregnancy  With her risk factors for preeclampsia, she will continue low dose aspirin daily until delivery. Preeclampsia precautions reviewed.    Follow up in about 1 week (around 2024) for BPP, UAD only, 2 weeks [facility] follow-up repeat BPP UAD.     Future Appointments   Date Time Provider Department Center   2024  9:45 AM ROOM 3, [facility]   2024 10:45 AM ROOM 3, [facility]   2024 11:00 AM Ernie Charles MD [facility]       Marisol Ochoa PA-C     This note was created with the assistance of [employer] voice recognition software. There may be transcription errors as a result of using this technology, however minimal. Effort has been made to ensure accuracy of transcription, but any obvious errors or omissions should be clarified with the author of the document.

## 2024-11-04 ENCOUNTER — OFFICE VISIT (OUTPATIENT)
Dept: MATERNAL FETAL MEDICINE | Facility: CLINIC | Age: 29
End: 2024-11-04
Payer: MEDICAID

## 2024-11-04 ENCOUNTER — PROCEDURE VISIT (OUTPATIENT)
Dept: MATERNAL FETAL MEDICINE | Facility: CLINIC | Age: 29
End: 2024-11-04
Payer: MEDICAID

## 2024-11-04 VITALS
WEIGHT: 143 LBS | DIASTOLIC BLOOD PRESSURE: 64 MMHG | HEIGHT: 55 IN | HEART RATE: 89 BPM | SYSTOLIC BLOOD PRESSURE: 106 MMHG | BODY MASS INDEX: 33.09 KG/M2

## 2024-11-04 DIAGNOSIS — G40.909 SEIZURE DISORDER DURING PREGNANCY IN THIRD TRIMESTER: Primary | ICD-10-CM

## 2024-11-04 DIAGNOSIS — O09.299 HX OF PRE-ECLAMPSIA IN PRIOR PREGNANCY, CURRENTLY PREGNANT: ICD-10-CM

## 2024-11-04 DIAGNOSIS — O99.353 SEIZURE DISORDER DURING PREGNANCY IN THIRD TRIMESTER: Primary | ICD-10-CM

## 2024-11-04 DIAGNOSIS — O36.5930 POOR FETAL GROWTH AFFECTING MANAGEMENT OF MOTHER IN THIRD TRIMESTER, SINGLE OR UNSPECIFIED FETUS: ICD-10-CM

## 2024-11-04 DIAGNOSIS — O99.353 SEIZURE DISORDER DURING PREGNANCY IN THIRD TRIMESTER: ICD-10-CM

## 2024-11-04 DIAGNOSIS — G40.909 SEIZURE DISORDER DURING PREGNANCY IN THIRD TRIMESTER: ICD-10-CM

## 2024-11-04 PROCEDURE — 76819 FETAL BIOPHYS PROFIL W/O NST: CPT | Mod: S$GLB,,, | Performed by: OBSTETRICS & GYNECOLOGY

## 2024-11-04 PROCEDURE — 1160F RVW MEDS BY RX/DR IN RCRD: CPT | Mod: CPTII,S$GLB,,

## 2024-11-04 PROCEDURE — 3074F SYST BP LT 130 MM HG: CPT | Mod: CPTII,S$GLB,,

## 2024-11-04 PROCEDURE — 76820 UMBILICAL ARTERY ECHO: CPT | Mod: S$GLB,,, | Performed by: OBSTETRICS & GYNECOLOGY

## 2024-11-04 PROCEDURE — 3078F DIAST BP <80 MM HG: CPT | Mod: CPTII,S$GLB,,

## 2024-11-04 PROCEDURE — 3008F BODY MASS INDEX DOCD: CPT | Mod: CPTII,S$GLB,,

## 2024-11-04 PROCEDURE — 1159F MED LIST DOCD IN RCRD: CPT | Mod: CPTII,S$GLB,,

## 2024-11-04 PROCEDURE — 99213 OFFICE O/P EST LOW 20 MIN: CPT | Mod: TH,S$GLB,,

## 2024-11-11 ENCOUNTER — PROCEDURE VISIT (OUTPATIENT)
Dept: MATERNAL FETAL MEDICINE | Facility: CLINIC | Age: 29
End: 2024-11-11
Payer: MEDICAID

## 2024-11-11 DIAGNOSIS — G40.909 SEIZURE DISORDER DURING PREGNANCY IN THIRD TRIMESTER: ICD-10-CM

## 2024-11-11 DIAGNOSIS — O09.299 HX OF PRE-ECLAMPSIA IN PRIOR PREGNANCY, CURRENTLY PREGNANT: ICD-10-CM

## 2024-11-11 DIAGNOSIS — O36.5930 POOR FETAL GROWTH AFFECTING MANAGEMENT OF MOTHER IN THIRD TRIMESTER, SINGLE OR UNSPECIFIED FETUS: ICD-10-CM

## 2024-11-11 DIAGNOSIS — O99.353 SEIZURE DISORDER DURING PREGNANCY IN THIRD TRIMESTER: ICD-10-CM

## 2024-11-11 PROCEDURE — 76820 UMBILICAL ARTERY ECHO: CPT | Mod: S$GLB,,, | Performed by: OBSTETRICS & GYNECOLOGY

## 2024-11-11 PROCEDURE — 76819 FETAL BIOPHYS PROFIL W/O NST: CPT | Mod: S$GLB,,, | Performed by: OBSTETRICS & GYNECOLOGY

## 2024-11-12 ENCOUNTER — TELEPHONE (OUTPATIENT)
Dept: MATERNAL FETAL MEDICINE | Facility: CLINIC | Age: 29
End: 2024-11-12
Payer: MEDICAID

## 2024-11-12 DIAGNOSIS — O36.5930 POOR FETAL GROWTH AFFECTING MANAGEMENT OF MOTHER IN THIRD TRIMESTER, SINGLE OR UNSPECIFIED FETUS: ICD-10-CM

## 2024-11-12 DIAGNOSIS — O99.353 SEIZURE DISORDER DURING PREGNANCY IN THIRD TRIMESTER: Primary | ICD-10-CM

## 2024-11-12 DIAGNOSIS — G40.909 SEIZURE DISORDER DURING PREGNANCY IN THIRD TRIMESTER: Primary | ICD-10-CM

## 2024-11-12 DIAGNOSIS — O09.299 HX OF PRE-ECLAMPSIA IN PRIOR PREGNANCY, CURRENTLY PREGNANT: ICD-10-CM

## 2024-11-12 NOTE — TELEPHONE ENCOUNTER
Called patient in regard to scheduling [email or web address]. Patient prefers Friday due to work schedule. NST setup with [email or web address] for 11/15/24@130pm. Patient notified.

## 2024-11-15 ENCOUNTER — HOSPITAL ENCOUNTER (OUTPATIENT)
Facility: HOSPITAL | Age: 29
Discharge: HOME | End: 2024-11-15
Attending: OBSTETRICS & GYNECOLOGY | Admitting: OBSTETRICS & GYNECOLOGY
Payer: MEDICAID

## 2024-11-15 VITALS
BODY MASS INDEX: 32.39 KG/M2 | HEART RATE: 100 BPM | WEIGHT: 144 LBS | HEIGHT: 56 IN | TEMPERATURE: 99 F | SYSTOLIC BLOOD PRESSURE: 92 MMHG | DIASTOLIC BLOOD PRESSURE: 51 MMHG | RESPIRATION RATE: 16 BRPM | OXYGEN SATURATION: 99 %

## 2024-11-15 DIAGNOSIS — O36.5990 IUGR (INTRAUTERINE GROWTH RESTRICTION) AFFECTING CARE OF MOTHER: ICD-10-CM

## 2024-11-15 PROCEDURE — 59025 FETAL NON-STRESS TEST: CPT

## 2024-11-15 NOTE — DISCHARGE INSTRUCTIONS
Keep all scheduled OB and MFM appointments. Return to the hospital for leakage of fluids that could be your water bag, decreased fetal movement, vaginal bleeding and/or regular contractions.

## 2024-11-20 NOTE — PROGRESS NOTES
Maternal Fetal Medicine Follow Up    Subjective:     Patient ID: [id number]    Chief Complaint: M follow up w/us       HPI: Nicole Dougherty is a 29 y.o. female  at 38w3d gestation with Estimated Date of Delivery: 24  who is here for follow-up consultation by MFM.    Patient has history of epilepsy. She reports no seizure in years. She is currently on Keppra 500 mg twice daily and folic acid 1 mg daily. She follows with neurologist in [place] at [facility].  She had elevated BMI of 31.83 on initial MFM consult visit.  She has history of postpartum preeclampsia in previous pregnancy. She is on low-dose aspirin once daily.  She has mild fetal growth restriction and is here for fetal testing today.  She had negative quad screen with DSR 1:1905.  She declined additional testing during pregnancy.       Interval history since last MFM visit: None.. She denies any leaking fluid, vaginal bleeding, contractions, decreased fetal movement. Denies headaches, visual disturbances, or epigastric pain.    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    Medications:  Current Outpatient Medications   Medication Instructions    aspirin (ECOTRIN) 81 mg, Oral, 2 times daily    folic acid (FOLVITE) 1 mg, Oral, Daily    ICAR-C PLUS Tab 1 tablet Orally once a day for 30 days    levetiracetam XR (KEPPRA XR) 500 mg Tb24 24 hr tablet 1 tablet Orally twice a day    PNV no.153/FA/om3/dha/epa/fish (PRENATAL GUMMIES ORAL) Take by mouth.    promethazine (PHENERGAN) 12.5 mg, 2  "times daily PRN       Review of Systems   12 point review of systems conducted, negative except as stated in the history of present illness. See HPI for details.      Objective:     Visit Vitals  /72 (BP Location: Left arm, Patient Position: Sitting)   Pulse 94   Ht 4' 8" (1.422 m)   Wt 64.9 kg (143 lb)   LMP  (LMP Unknown)   BMI 32.06 kg/m²        Physical Exam  Vitals and nursing note reviewed.   Constitutional:       Appearance: Normal appearance.      Comments: Increased BMI   HENT:      Head: Normocephalic and atraumatic.      Nose: Nose normal. No congestion.   Cardiovascular:      Rate and Rhythm: Normal rate.   Pulmonary:      Effort: Pulmonary effort is normal.   Skin:     Findings: No rash.   Neurological:      Mental Status: She is alert and oriented to person, place, and time.   Psychiatric:         Mood and Affect: Mood normal.         Behavior: Behavior normal.         Thought Content: Thought content normal.         Judgment: Judgment normal.         Assessment/Plan:     29 y.o.  female with IUP at 38w3d    Seizure disorder in pregnancy  There is mild FGR with continued fetal growth with an EFW of 2766 g at the 10% and the AC at the 3% on 24.  AFV is normal. BPP is 8/8 today (2024).  Umbilical artery Doppler is normal today (2024).    The risk of seizures outweigh any risk of medication and recommend her to continue current medication regimen per neurology (keppra 500 mg BID), and advised to continue the Folic acid 1 mg daily. If she has any seizures, she needs to report that immediately.  It is also recommended to avoid driving or operating heavy/hazardous equipment until 6 months post last seizure.      Mild fetal growth restriction  She declined amniocentesis . She reported normal aneuploidy screening earlier in pregnancy (requested). . She was advised of need for  evaluation.    Complications of growth-restricted neonates include hypoglycemia, " hyperbilirubinemia, hypothermia, seizures, sepsis, IVH, RDS, necrotizing enterocolitis, and  asphyxia. Long-term complications include cognitive delay and adult diseases such as cardiovascular disorders and type 2 diabetes. There is an increased risk (~20%) for recurrence of fetal growth restriction in a future pregnancy.    We will continue to monitor interval fetal growth every 3 weeks and do twice weekly fetal testing, including an NST at least once per week. She was previously instructed that fetal testing is not a 100% protective against the risk of fetal demise in-utero. Reviewed the importance of doing fetal kick counts three times a day and resting in a lateral recumbent position and reporting immediately at any time there is any decreased fetal movement even if the same day of testing was emphasized.     We will continue fetal kick counts and plan delivery on Monday.      Elevated BMI greater than 30  Body mass index is 32.06 kg/m². With  2 lb gain since last visit, she was advised to follow a healthy low caloric diet.  Excess weight gain would be associated with gestational hypertension, gestational diabetes and adverse  outcomes, including fetal demise in utero.    Reviewed importance of FKC 3/day and prn with instructions to immediately report any decreased fetal movement.    It is important to lose weight after the pregnancy is over, especially before a future pregnancy. Breastfeeding may be an important tool in reducing the postpartum weight retention. Fetal risks were discussed with short term risk of fetal/ obesity and long term risk of adolescent component of metabolic syndrome.      History of postpartum preeclampsia in previous pregnancy  With her risk factors for preeclampsia, she will continue low dose aspirin daily until delivery. Preeclampsia precautions reviewed.    Follow up for None. Keep follow up with primary OB.     No future appointments.    Patient was  evaluated and examined by Dr. Charles. CHAD Fishman, helped in pre charting of part of note.     This note was created with the assistance of [employer] voice recognition software. There may be transcription errors as a result of using this technology, however minimal. Effort has been made to ensure accuracy of transcription, but any obvious errors or omissions should be clarified with the author of the document.

## 2024-11-21 ENCOUNTER — PROCEDURE VISIT (OUTPATIENT)
Dept: MATERNAL FETAL MEDICINE | Facility: CLINIC | Age: 29
End: 2024-11-21
Payer: MEDICAID

## 2024-11-21 ENCOUNTER — OFFICE VISIT (OUTPATIENT)
Dept: MATERNAL FETAL MEDICINE | Facility: CLINIC | Age: 29
End: 2024-11-21
Payer: MEDICAID

## 2024-11-21 VITALS
BODY MASS INDEX: 32.17 KG/M2 | SYSTOLIC BLOOD PRESSURE: 108 MMHG | DIASTOLIC BLOOD PRESSURE: 72 MMHG | HEART RATE: 94 BPM | HEIGHT: 56 IN | WEIGHT: 143 LBS

## 2024-11-21 DIAGNOSIS — G40.909 SEIZURE DISORDER DURING PREGNANCY IN THIRD TRIMESTER: ICD-10-CM

## 2024-11-21 DIAGNOSIS — O09.299 HX OF PRE-ECLAMPSIA IN PRIOR PREGNANCY, CURRENTLY PREGNANT: ICD-10-CM

## 2024-11-21 DIAGNOSIS — G40.909 SEIZURE DISORDER DURING PREGNANCY IN THIRD TRIMESTER: Primary | ICD-10-CM

## 2024-11-21 DIAGNOSIS — O99.353 SEIZURE DISORDER DURING PREGNANCY IN THIRD TRIMESTER: Primary | ICD-10-CM

## 2024-11-21 DIAGNOSIS — O36.5930 POOR FETAL GROWTH AFFECTING MANAGEMENT OF MOTHER IN THIRD TRIMESTER, SINGLE OR UNSPECIFIED FETUS: ICD-10-CM

## 2024-11-21 DIAGNOSIS — O99.353 SEIZURE DISORDER DURING PREGNANCY IN THIRD TRIMESTER: ICD-10-CM

## 2024-11-21 PROCEDURE — 1159F MED LIST DOCD IN RCRD: CPT | Mod: CPTII,S$GLB,, | Performed by: OBSTETRICS & GYNECOLOGY

## 2024-11-21 PROCEDURE — 76816 OB US FOLLOW-UP PER FETUS: CPT | Mod: S$GLB,,, | Performed by: OBSTETRICS & GYNECOLOGY

## 2024-11-21 PROCEDURE — 3078F DIAST BP <80 MM HG: CPT | Mod: CPTII,S$GLB,, | Performed by: OBSTETRICS & GYNECOLOGY

## 2024-11-21 PROCEDURE — 99213 OFFICE O/P EST LOW 20 MIN: CPT | Mod: TH,S$GLB,, | Performed by: OBSTETRICS & GYNECOLOGY

## 2024-11-21 PROCEDURE — 1160F RVW MEDS BY RX/DR IN RCRD: CPT | Mod: CPTII,S$GLB,, | Performed by: OBSTETRICS & GYNECOLOGY

## 2024-11-21 PROCEDURE — 76820 UMBILICAL ARTERY ECHO: CPT | Mod: S$GLB,,, | Performed by: OBSTETRICS & GYNECOLOGY

## 2024-11-21 PROCEDURE — 3008F BODY MASS INDEX DOCD: CPT | Mod: CPTII,S$GLB,, | Performed by: OBSTETRICS & GYNECOLOGY

## 2024-11-21 PROCEDURE — 76819 FETAL BIOPHYS PROFIL W/O NST: CPT | Mod: S$GLB,,, | Performed by: OBSTETRICS & GYNECOLOGY

## 2024-11-21 PROCEDURE — 3074F SYST BP LT 130 MM HG: CPT | Mod: CPTII,S$GLB,, | Performed by: OBSTETRICS & GYNECOLOGY
